# Patient Record
Sex: MALE | Race: WHITE | NOT HISPANIC OR LATINO | Employment: OTHER | URBAN - METROPOLITAN AREA
[De-identification: names, ages, dates, MRNs, and addresses within clinical notes are randomized per-mention and may not be internally consistent; named-entity substitution may affect disease eponyms.]

---

## 2024-02-08 ENCOUNTER — HOSPITAL ENCOUNTER (EMERGENCY)
Facility: HOSPITAL | Age: 59
Discharge: HOME | End: 2024-02-08
Attending: EMERGENCY MEDICINE
Payer: MEDICAID

## 2024-02-08 VITALS
BODY MASS INDEX: 24.25 KG/M2 | SYSTOLIC BLOOD PRESSURE: 134 MMHG | HEART RATE: 93 BPM | HEIGHT: 68 IN | TEMPERATURE: 98.4 F | DIASTOLIC BLOOD PRESSURE: 83 MMHG | OXYGEN SATURATION: 95 % | RESPIRATION RATE: 19 BRPM | WEIGHT: 160 LBS

## 2024-02-08 DIAGNOSIS — K29.70 GASTRITIS WITHOUT BLEEDING, UNSPECIFIED CHRONICITY, UNSPECIFIED GASTRITIS TYPE: ICD-10-CM

## 2024-02-08 DIAGNOSIS — R10.13 EPIGASTRIC PAIN: Primary | ICD-10-CM

## 2024-02-08 LAB
ALBUMIN SERPL-MCNC: 4.1 G/DL (ref 3.5–5.2)
ALBUMIN/GLOB SERPL: 1.5 G/DL
ALP SERPL-CCNC: 75 U/L (ref 39–117)
ALT SERPL W P-5'-P-CCNC: 23 U/L (ref 1–41)
ANION GAP SERPL CALCULATED.3IONS-SCNC: 9 MMOL/L (ref 5–15)
AST SERPL-CCNC: 18 U/L (ref 1–40)
BASOPHILS # BLD AUTO: 0.1 10*3/MM3 (ref 0–0.2)
BASOPHILS NFR BLD AUTO: 1.3 % (ref 0–1.5)
BILIRUB SERPL-MCNC: 0.3 MG/DL (ref 0–1.2)
BUN SERPL-MCNC: 15 MG/DL (ref 6–20)
BUN/CREAT SERPL: 20 (ref 7–25)
CALCIUM SPEC-SCNC: 9.1 MG/DL (ref 8.6–10.5)
CHLORIDE SERPL-SCNC: 107 MMOL/L (ref 98–107)
CO2 SERPL-SCNC: 25 MMOL/L (ref 22–29)
CREAT SERPL-MCNC: 0.75 MG/DL (ref 0.76–1.27)
DEPRECATED RDW RBC AUTO: 44.6 FL (ref 37–54)
EGFRCR SERPLBLD CKD-EPI 2021: 104 ML/MIN/1.73
EOSINOPHIL # BLD AUTO: 0.1 10*3/MM3 (ref 0–0.4)
EOSINOPHIL NFR BLD AUTO: 1.2 % (ref 0.3–6.2)
ERYTHROCYTE [DISTWIDTH] IN BLOOD BY AUTOMATED COUNT: 14.4 % (ref 12.3–15.4)
GLOBULIN UR ELPH-MCNC: 2.8 GM/DL
GLUCOSE SERPL-MCNC: 102 MG/DL (ref 65–99)
HCT VFR BLD AUTO: 40 % (ref 37.5–51)
HGB BLD-MCNC: 13.2 G/DL (ref 13–17.7)
LIPASE SERPL-CCNC: 38 U/L (ref 13–60)
LYMPHOCYTES # BLD AUTO: 1.6 10*3/MM3 (ref 0.7–3.1)
LYMPHOCYTES NFR BLD AUTO: 21.5 % (ref 19.6–45.3)
MCH RBC QN AUTO: 29.6 PG (ref 26.6–33)
MCHC RBC AUTO-ENTMCNC: 33 G/DL (ref 31.5–35.7)
MCV RBC AUTO: 89.8 FL (ref 79–97)
MONOCYTES # BLD AUTO: 0.6 10*3/MM3 (ref 0.1–0.9)
MONOCYTES NFR BLD AUTO: 8.6 % (ref 5–12)
NEUTROPHILS NFR BLD AUTO: 4.9 10*3/MM3 (ref 1.7–7)
NEUTROPHILS NFR BLD AUTO: 67.4 % (ref 42.7–76)
NRBC BLD AUTO-RTO: 0 /100 WBC (ref 0–0.2)
PLATELET # BLD AUTO: 373 10*3/MM3 (ref 140–450)
PMV BLD AUTO: 7.3 FL (ref 6–12)
POTASSIUM SERPL-SCNC: 4.7 MMOL/L (ref 3.5–5.2)
PROT SERPL-MCNC: 6.9 G/DL (ref 6–8.5)
RBC # BLD AUTO: 4.45 10*6/MM3 (ref 4.14–5.8)
SODIUM SERPL-SCNC: 141 MMOL/L (ref 136–145)
WBC NRBC COR # BLD AUTO: 7.2 10*3/MM3 (ref 3.4–10.8)

## 2024-02-08 PROCEDURE — 25010000002 HYDROMORPHONE 1 MG/ML SOLUTION: Performed by: EMERGENCY MEDICINE

## 2024-02-08 PROCEDURE — 96374 THER/PROPH/DIAG INJ IV PUSH: CPT

## 2024-02-08 PROCEDURE — 25010000002 ONDANSETRON PER 1 MG: Performed by: EMERGENCY MEDICINE

## 2024-02-08 PROCEDURE — 96375 TX/PRO/DX INJ NEW DRUG ADDON: CPT

## 2024-02-08 PROCEDURE — 99283 EMERGENCY DEPT VISIT LOW MDM: CPT

## 2024-02-08 PROCEDURE — 85025 COMPLETE CBC W/AUTO DIFF WBC: CPT | Performed by: EMERGENCY MEDICINE

## 2024-02-08 PROCEDURE — 83690 ASSAY OF LIPASE: CPT | Performed by: EMERGENCY MEDICINE

## 2024-02-08 PROCEDURE — 80053 COMPREHEN METABOLIC PANEL: CPT | Performed by: EMERGENCY MEDICINE

## 2024-02-08 RX ORDER — ONDANSETRON 2 MG/ML
4 INJECTION INTRAMUSCULAR; INTRAVENOUS ONCE
Status: COMPLETED | OUTPATIENT
Start: 2024-02-08 | End: 2024-02-08

## 2024-02-08 RX ORDER — SODIUM CHLORIDE 0.9 % (FLUSH) 0.9 %
10 SYRINGE (ML) INJECTION AS NEEDED
Status: DISCONTINUED | OUTPATIENT
Start: 2024-02-08 | End: 2024-02-08 | Stop reason: HOSPADM

## 2024-02-08 RX ORDER — PANTOPRAZOLE SODIUM 40 MG/1
40 TABLET, DELAYED RELEASE ORAL DAILY
Qty: 14 TABLET | Refills: 0 | Status: SHIPPED | OUTPATIENT
Start: 2024-02-08

## 2024-02-08 RX ORDER — TRAMADOL HYDROCHLORIDE 50 MG/1
50 TABLET ORAL EVERY 6 HOURS PRN
Qty: 12 TABLET | Refills: 0 | Status: SHIPPED | OUTPATIENT
Start: 2024-02-08

## 2024-02-08 RX ADMIN — HYDROMORPHONE HYDROCHLORIDE 0.5 MG: 1 INJECTION, SOLUTION INTRAMUSCULAR; INTRAVENOUS; SUBCUTANEOUS at 16:36

## 2024-02-08 RX ADMIN — ONDANSETRON 4 MG: 2 INJECTION INTRAMUSCULAR; INTRAVENOUS at 16:35

## 2024-02-08 NOTE — ED NOTES
Pt to ED RM18 via EMS. Pt reports that he was at [facility] today and he ate subway and then he felt like he needed to have a bowel movement so he went to the restroom and had a normal bowel movement. Pt says that after that he started having sharp pains in the epigastric region that radiate up into his chest. He reports that his pain is worse when he lets pressure off of it, pt is laying with his hands on his abdomen holding pressure. He rates his pain at an 8/10 and has not taken anything for pain. He denies chest pain, soa, vomiting and diarrhea. No other complaints at this time.

## 2024-02-08 NOTE — ED PROVIDER NOTES
Subjective   History of Present Illness  Patient is a 59-year-old male complaining of abdominal pain that developed over the past 24 hours.  States it feels similar to pancreatitis he has had in the past.  Nuys fever vomit diarrhea dysuria or other complaint.  He has had mild nausea.      Review of Systems    History reviewed. No pertinent past medical history.    No Known Allergies    Past Surgical History:   Procedure Laterality Date    JOINT REPLACEMENT      REPLACEMENT TOTAL KNEE Left        History reviewed. No pertinent family history.    Social History     Socioeconomic History    Marital status: Single   Tobacco Use    Smoking status: Every Day     Packs/day: .5     Types: Cigarettes    Smokeless tobacco: Never   Substance and Sexual Activity    Alcohol use: Not Currently    Drug use: Never           Objective   Physical Exam  Neck has no adenopathy JVD or bruits.  Lungs clear.  Heart has a regular rate rhythm without murmur rub or gallop.  Chest is nontender.  Ab soft with minimal diffuse tenderness.  Patient has normal bowel sounds without rebound or guarding.  Back has no tenderness Permenter exam unremarkable.  Procedures           ED Course      Results for orders placed or performed during the hospital encounter of 02/08/24   Comprehensive Metabolic Panel    Specimen: Blood   Result Value Ref Range    Glucose 102 (H) 65 - 99 mg/dL    BUN 15 6 - 20 mg/dL    Creatinine 0.75 (L) 0.76 - 1.27 mg/dL    Sodium 141 136 - 145 mmol/L    Potassium 4.7 3.5 - 5.2 mmol/L    Chloride 107 98 - 107 mmol/L    CO2 25.0 22.0 - 29.0 mmol/L    Calcium 9.1 8.6 - 10.5 mg/dL    Total Protein 6.9 6.0 - 8.5 g/dL    Albumin 4.1 3.5 - 5.2 g/dL    ALT (SGPT) 23 1 - 41 U/L    AST (SGOT) 18 1 - 40 U/L    Alkaline Phosphatase 75 39 - 117 U/L    Total Bilirubin 0.3 0.0 - 1.2 mg/dL    Globulin 2.8 gm/dL    A/G Ratio 1.5 g/dL    BUN/Creatinine Ratio 20.0 7.0 - 25.0    Anion Gap 9.0 5.0 - 15.0 mmol/L    eGFR 104.0 >60.0 mL/min/1.73    Lipase    Specimen: Blood   Result Value Ref Range    Lipase 38 13 - 60 U/L   CBC Auto Differential    Specimen: Blood   Result Value Ref Range    WBC 7.20 3.40 - 10.80 10*3/mm3    RBC 4.45 4.14 - 5.80 10*6/mm3    Hemoglobin 13.2 13.0 - 17.7 g/dL    Hematocrit 40.0 37.5 - 51.0 %    MCV 89.8 79.0 - 97.0 fL    MCH 29.6 26.6 - 33.0 pg    MCHC 33.0 31.5 - 35.7 g/dL    RDW 14.4 12.3 - 15.4 %    RDW-SD 44.6 37.0 - 54.0 fl    MPV 7.3 6.0 - 12.0 fL    Platelets 373 140 - 450 10*3/mm3    Neutrophil % 67.4 42.7 - 76.0 %    Lymphocyte % 21.5 19.6 - 45.3 %    Monocyte % 8.6 5.0 - 12.0 %    Eosinophil % 1.2 0.3 - 6.2 %    Basophil % 1.3 0.0 - 1.5 %    Neutrophils, Absolute 4.90 1.70 - 7.00 10*3/mm3    Lymphocytes, Absolute 1.60 0.70 - 3.10 10*3/mm3    Monocytes, Absolute 0.60 0.10 - 0.90 10*3/mm3    Eosinophils, Absolute 0.10 0.00 - 0.40 10*3/mm3    Basophils, Absolute 0.10 0.00 - 0.20 10*3/mm3    nRBC 0.0 0.0 - 0.2 /100 WBC                                              Medical Decision Making  Patient is no leukocytosis no left shift and no anemia.  Metabolic panel shows no renal insufficiency or electrolyte [place].  There is no evidence of hepatitis or pancreatitis based on LFTs and lipase.  Patient will be discharged nonspecific abdominal pain.  Will place on Protonix and Ultram.  Will see his MD for recheck.    Amount and/or Complexity of Data Reviewed  Labs: ordered. Decision-making details documented in ED Course.    Risk  Prescription drug management.        Final diagnoses:   Epigastric pain   Gastritis without bleeding, unspecified chronicity, unspecified gastritis type       ED Disposition  ED Disposition       ED Disposition   Discharge    Condition   Stable    Comment   --               No follow-up provider specified.       Medication List        New Prescriptions      pantoprazole 40 MG EC tablet  Commonly known as: PROTONIX  Take 1 tablet by mouth Daily.     traMADol 50 MG tablet  Commonly known as: ULTRAM  Take  1 tablet by mouth Every 6 (Six) Hours As Needed for Severe Pain.               Where to Get Your Medications        These medications were sent to [facility]/pharmacy #[id number] - [place], [place] - [address] - [phone]  - [phone]   [address] [place] [address]      Phone: [phone]   pantoprazole 40 MG EC tablet  traMADol 50 MG tablet            Filemon Perez MD  02/08/24 [id number]